# Patient Record
Sex: MALE | Race: WHITE | NOT HISPANIC OR LATINO | Employment: FULL TIME | URBAN - METROPOLITAN AREA
[De-identification: names, ages, dates, MRNs, and addresses within clinical notes are randomized per-mention and may not be internally consistent; named-entity substitution may affect disease eponyms.]

---

## 2020-02-23 DIAGNOSIS — Z71.9 HEALTH EDUCATION/COUNSELING: Primary | ICD-10-CM

## 2020-02-27 ENCOUNTER — NUTRITION (OUTPATIENT)
Dept: NUTRITION | Facility: CLINIC | Age: 65
End: 2020-02-27
Payer: COMMERCIAL

## 2020-02-27 DIAGNOSIS — Z71.9 HEALTH EDUCATION/COUNSELING: ICD-10-CM

## 2020-02-27 LAB
ERYTHROCYTE [DISTWIDTH] IN BLOOD BY AUTOMATED COUNT: 13.1 % (ref 11.5–14.5)
ERYTHROCYTE [SEDIMENTATION RATE] IN BLOOD BY WESTERGREN METHOD: <2 MM/HR (ref 0–23)
HCT VFR BLD AUTO: 50.2 % (ref 40–54)
HGB BLD-MCNC: 16.3 G/DL (ref 14–18)
MCH RBC QN AUTO: 30.8 PG (ref 27–31)
MCHC RBC AUTO-ENTMCNC: 32.5 G/DL (ref 32–36)
MCV RBC AUTO: 95 FL (ref 82–98)
PLATELET # BLD AUTO: 247 K/UL (ref 150–350)
PMV BLD AUTO: 10.9 FL (ref 9.2–12.9)
RBC # BLD AUTO: 5.3 M/UL (ref 4.6–6.2)
WBC # BLD AUTO: 9.4 K/UL (ref 3.9–12.7)

## 2020-02-27 PROCEDURE — 36415 COLL VENOUS BLD VENIPUNCTURE: CPT

## 2020-02-28 LAB
ALBUMIN SERPL BCP-MCNC: 3.7 G/DL (ref 3.5–5.2)
ALP SERPL-CCNC: 64 U/L (ref 55–135)
ALT SERPL W/O P-5'-P-CCNC: 21 U/L (ref 10–44)
ANION GAP SERPL CALC-SCNC: 11 MMOL/L (ref 8–16)
AST SERPL-CCNC: 22 U/L (ref 10–40)
BILIRUB SERPL-MCNC: 0.6 MG/DL (ref 0.1–1)
BUN SERPL-MCNC: 18 MG/DL (ref 8–23)
CALCIUM SERPL-MCNC: 8.9 MG/DL (ref 8.7–10.5)
CHLORIDE SERPL-SCNC: 103 MMOL/L (ref 95–110)
CHOLEST SERPL-MCNC: 210 MG/DL (ref 120–199)
CHOLEST/HDLC SERPL: 2.8 {RATIO} (ref 2–5)
CO2 SERPL-SCNC: 25 MMOL/L (ref 23–29)
CREAT SERPL-MCNC: 1 MG/DL (ref 0.5–1.4)
CRP SERPL-MCNC: 1.93 MG/L (ref 0–3.19)
EST. GFR  (AFRICAN AMERICAN): >60 ML/MIN/1.73 M^2
EST. GFR  (NON AFRICAN AMERICAN): >60 ML/MIN/1.73 M^2
FOLATE SERPL-MCNC: 16 NG/ML (ref 4–24)
GGT SERPL-CCNC: 27 U/L (ref 8–55)
GLUCOSE SERPL-MCNC: 85 MG/DL (ref 70–110)
HDLC SERPL-MCNC: 75 MG/DL (ref 40–75)
HDLC SERPL: 35.7 % (ref 20–50)
LDLC SERPL CALC-MCNC: 111 MG/DL (ref 63–159)
NONHDLC SERPL-MCNC: 135 MG/DL
POTASSIUM SERPL-SCNC: 4.1 MMOL/L (ref 3.5–5.1)
PROT SERPL-MCNC: 7 G/DL (ref 6–8.4)
SODIUM SERPL-SCNC: 139 MMOL/L (ref 136–145)
TRIGL SERPL-MCNC: 120 MG/DL (ref 30–150)
VIT B12 SERPL-MCNC: 714 PG/ML (ref 210–950)

## 2021-04-28 ENCOUNTER — PATIENT MESSAGE (OUTPATIENT)
Dept: RESEARCH | Facility: HOSPITAL | Age: 66
End: 2021-04-28

## 2022-02-22 DIAGNOSIS — Z71.9 HEALTH EDUCATION/COUNSELING: Primary | ICD-10-CM

## 2022-08-26 DIAGNOSIS — M25.571 CHRONIC PAIN OF RIGHT ANKLE: Primary | ICD-10-CM

## 2022-08-26 DIAGNOSIS — G89.29 CHRONIC PAIN OF RIGHT ANKLE: Primary | ICD-10-CM

## 2022-08-29 ENCOUNTER — HOSPITAL ENCOUNTER (OUTPATIENT)
Dept: RADIOLOGY | Facility: HOSPITAL | Age: 67
Discharge: HOME | End: 2022-08-29
Attending: ORTHOPAEDIC SURGERY
Payer: MEDICARE

## 2022-08-29 ENCOUNTER — OFFICE VISIT (OUTPATIENT)
Dept: ORTHOPEDICS | Facility: CLINIC | Age: 67
End: 2022-08-29
Payer: MEDICARE

## 2022-08-29 VITALS — HEIGHT: 71 IN | BODY MASS INDEX: 30.8 KG/M2 | WEIGHT: 220 LBS

## 2022-08-29 DIAGNOSIS — M19.079 ARTHRITIS OF ANKLE: ICD-10-CM

## 2022-08-29 DIAGNOSIS — G89.29 CHRONIC PAIN OF RIGHT ANKLE: Primary | ICD-10-CM

## 2022-08-29 DIAGNOSIS — S93.421A SPRAIN OF DELTOID LIGAMENT OF RIGHT ANKLE, INITIAL ENCOUNTER: ICD-10-CM

## 2022-08-29 DIAGNOSIS — M25.571 CHRONIC PAIN OF RIGHT ANKLE: Primary | ICD-10-CM

## 2022-08-29 DIAGNOSIS — G89.29 CHRONIC PAIN OF RIGHT ANKLE: ICD-10-CM

## 2022-08-29 DIAGNOSIS — M25.571 CHRONIC PAIN OF RIGHT ANKLE: ICD-10-CM

## 2022-08-29 DIAGNOSIS — M25.871 IMPINGEMENT SYNDROME OF RIGHT ANKLE: Primary | ICD-10-CM

## 2022-08-29 PROCEDURE — 99999 PR PBB SHADOW E&M-EST. PATIENT-LVL II: ICD-10-PCS | Mod: PBBFAC,,, | Performed by: ORTHOPAEDIC SURGERY

## 2022-08-29 PROCEDURE — 99999 PR PBB SHADOW E&M-EST. PATIENT-LVL II: CPT | Mod: PBBFAC,,, | Performed by: ORTHOPAEDIC SURGERY

## 2022-08-29 PROCEDURE — 73610 X-RAY EXAM OF ANKLE: CPT | Mod: 26,RT,, | Performed by: STUDENT IN AN ORGANIZED HEALTH CARE EDUCATION/TRAINING PROGRAM

## 2022-08-29 PROCEDURE — 99203 PR OFFICE/OUTPT VISIT, NEW, LEVL III, 30-44 MIN: ICD-10-PCS | Mod: S$GLB,,, | Performed by: ORTHOPAEDIC SURGERY

## 2022-08-29 PROCEDURE — 99203 OFFICE O/P NEW LOW 30 MIN: CPT | Mod: S$GLB,,, | Performed by: ORTHOPAEDIC SURGERY

## 2022-08-29 PROCEDURE — 73610 X-RAY EXAM OF ANKLE: CPT | Mod: TC,RT

## 2022-08-29 PROCEDURE — 73610 XR ANKLE COMPLETE 3 VIEW RIGHT: ICD-10-PCS | Mod: 26,RT,, | Performed by: STUDENT IN AN ORGANIZED HEALTH CARE EDUCATION/TRAINING PROGRAM

## 2022-08-29 PROCEDURE — 99212 OFFICE O/P EST SF 10 MIN: CPT | Mod: PBBFAC | Performed by: ORTHOPAEDIC SURGERY

## 2022-08-29 NOTE — PROGRESS NOTES
Patient ID: Kj Yu  YOB: 1955  MRN: [id number]    Chief Complaint: Pain of the Right Ankle      Referred By: [employer] [facility]     History of Present Illness: Kj Yu is a  66 y.o. male    with a chief complaint of Pain of the Right Ankle    Kj presents today as a new patient with right ankle pain. He reports that he initially injured his ankle in April when tripping down stairs. He rolled it but is unsure which way. He did not feel a pop. He reports managing with rest, icing and an over the counter brace. He reports continued medial ankle pain and instability. He feels better when stabilized.     HPI    Past Medical History:   History reviewed. No pertinent past medical history.  Past Surgical History:   Procedure Laterality Date    HERNIA REPAIR      KNEE SURGERY       Family History   Problem Relation Age of Onset    Heart disease Mother      Social History     Socioeconomic History    Marital status:    Tobacco Use    Smoking status: Never       Review of patient's allergies indicates:   Allergen Reactions    Codeine      ROS    Physical Exam:   Body mass index is 30.68 kg/m².  There were no vitals filed for this visit.   GENERAL: Well appearing, appropriate for stated age, no acute distress.  CARDIOVASCULAR: Pulses regular by peripheral palpation.  PULMONARY: Respirations are even and non-labored.  NEURO: Awake, alert, and oriented x 3.  PSYCH: Mood & affect are appropriate.  HEENT: Head is normocephalic and atraumatic.          Right Ankle/Foot Exam     Inspection   Effusion: Ankle - present     Range of Motion   Ankle Joint   Dorsiflexion:  20   Plantar flexion:  25   Subtalar Joint   Inversion:  15   Eversion:  15     Tests   Anterior drawer: negative  Varus tilt: positive  Squeeze Test: negative    Other   Ankle Crepitus: present  Sensation: normal        Muscle Strength   Right Lower Extremity   Anterior tibial:  5/5   Posterior tibial:   5/5   Gastrocsoleus:  5/5   Peroneal muscle:  5/5     Vascular Exam     Right Pulses  Dorsalis Pedis:      2+  Posterior Tibial:      2+        Imaging:    X-Ray Ankle Complete Right  Narrative: EXAMINATION:  Three radiographic views of the RIGHT ANKLE.    CLINICAL HISTORY:  Pain in right ankle and joints of right foot    TECHNIQUE:  Three radiographic views of the RIGHT ANKLE.    COMPARISON:  None.    FINDINGS:  Three views of the right ankle demonstrate normal alignment.  There is no fracture.  There is no bony mass lesion.  The talar dome is intact.  The mortise joint is symmetric.  There is no soft tissue swelling.  Impression: No acute abnormality of the right ankle.    Electronically signed by: Silvano Gonzalez MD  Date:    08/29/2022  Time:    13:06      Relevant imaging results reviewed and interpreted by me, discussed with the patient and / or family today.     Other Tests:         Patient Instructions   Assessment:  Kj Yu is a  66 y.o. male    with a chief complaint of Pain of the Right Ankle    Medial impingement of right ankle   Ankle sprain  with recurrent instability     Encounter Diagnoses   Name Primary?    Impingement syndrome of right ankle Yes    Sprain of deltoid ligament of right ankle, initial encounter     Arthritis of ankle       Plan:  Brace for stability  Start PT at [facility]   Referral to Dr. Hyde for IA CSI of right ankle if no improvement vs MRI   Discussed referral to foot and ankle to discuss fusion     Follow-up: 4 weeks with gravity stress view or sooner if there are any problems between now and then.    Leave Review:   Google: Leave Google Review  Healthgrades: Leave Healthgrades Review    After Hours Number: [phone]      Provider Note/Medical Decision Making:       I discussed worrisome and red flag signs and symptoms with the patient. The patient expressed understanding and agreed to alert me immediately or to go to the emergency room if  they experience any of these.   Treatment plan was developed with input from the patient/family, and they expressed understanding and agreement with the plan. All questions were answered today.    Disclaimer: This note was prepared using a voice recognition system and is likely to have sound alike errors within the text.     I, Kamilla Roberts, acted as a scribe for Robert Hernández MD for the duration of this office visit.

## 2022-08-29 NOTE — PATIENT INSTRUCTIONS
Assessment:  Kj Yu is a  66 y.o. male    with a chief complaint of Pain of the Right Ankle    Medial impingement of right ankle   Ankle sprain  with recurrent instability     Encounter Diagnoses   Name Primary?    Impingement syndrome of right ankle Yes    Sprain of deltoid ligament of right ankle, initial encounter     Arthritis of ankle       Plan:  Brace for stability  Start PT at [facility]   Referral to Dr. Hyde for IA CSI of right ankle if no improvement vs MRI   Discussed referral to foot and ankle to discuss fusion     Follow-up: 4 weeks with gravity stress view or sooner if there are any problems between now and then.    Leave Review:   Google: Leave Google Review  Healthgrades: Leave Healthgrades Review    After Hours Number: [phone]

## 2022-09-13 ENCOUNTER — CLINICAL SUPPORT (OUTPATIENT)
Dept: REHABILITATION | Facility: HOSPITAL | Age: 67
End: 2022-09-13
Attending: ORTHOPAEDIC SURGERY
Payer: MEDICARE

## 2022-09-13 DIAGNOSIS — M25.871 IMPINGEMENT SYNDROME OF RIGHT ANKLE: ICD-10-CM

## 2022-09-13 DIAGNOSIS — M25.571 CHRONIC PAIN OF RIGHT ANKLE: ICD-10-CM

## 2022-09-13 DIAGNOSIS — M25.561 ACUTE PAIN OF BOTH KNEES: ICD-10-CM

## 2022-09-13 DIAGNOSIS — M25.562 ACUTE PAIN OF BOTH KNEES: ICD-10-CM

## 2022-09-13 DIAGNOSIS — S93.421A SPRAIN OF DELTOID LIGAMENT OF RIGHT ANKLE, INITIAL ENCOUNTER: ICD-10-CM

## 2022-09-13 DIAGNOSIS — G89.29 CHRONIC PAIN OF RIGHT ANKLE: ICD-10-CM

## 2022-09-13 DIAGNOSIS — R29.898 DECREASED STRENGTH OF LOWER EXTREMITY: ICD-10-CM

## 2022-09-13 DIAGNOSIS — M19.079 ARTHRITIS OF ANKLE: ICD-10-CM

## 2022-09-13 PROCEDURE — 97162 PT EVAL MOD COMPLEX 30 MIN: CPT | Performed by: PHYSICAL THERAPIST

## 2022-09-13 PROCEDURE — 97110 THERAPEUTIC EXERCISES: CPT | Performed by: PHYSICAL THERAPIST

## 2022-09-13 NOTE — PLAN OF CARE
[facility]   Physical Therapy Initial Evaluation   Date: 9/13/2022   Name: Kj Yu  Clinic Number: [id number]    Therapy Diagnosis:    Encounter Diagnoses   Name Primary?    Acute pain of both knees     Decreased strength of lower extremity     Chronic pain of right ankle     Impingement syndrome of right ankle     Sprain of deltoid ligament of right ankle, initial encounter     Arthritis of ankle       Physician: Robert Hernández MD     Physician Orders: PT Eval and Treat  Medical Diagnosis from Referral: impingement syndrome of right ankle, sprain of deltoid ligament of right ankle, arthritis of ankle  Evaluation Date: 9/13/2022  Authorization Period Expiration: 8/29/2022  Plan of Care Expiration: 12/12/2022  Progress Note Due: 10/13/2022  Visit # / Visits authorized: 1/1   FOTO: 1/3     Precautions: Standard and inguinal hernia (right)    Time In: 1220  Time Out: 1300  Total Billable Time (timed & untimed codes): 40 minutes    SUBJECTIVE   Date of onset: April of 2022    History of current condition - Kj reports: that he twisted his right ankle in April coming down stairs. The ankle seemed to get better on its own, but then he went hiking at the end of July. Not long after that, in early August, his ankle started bothering him again. Over past week to week and a half, the ankle has been feeling better again. However, both of his knees start bothering him about a week ago, with the left being worse than the right. He is unsure what is causing the knee pain, but that is his bigger issue at this time. Patient reports that he had a right inguinal hernia repair in June of 2022, and he just found out he has another on the same leg. This will be his third inguinal hernia on this right side. Patient reports that his doctor is trying to schedule another surgery. His knee pain could be coming from compensating due to hip pain as well.   PMH: Previous meniscectomy approximately 15  years ago - unsure of which knee    Falls: none    Exercise Regimen: prior to hernia surgery 6/2022 was doing body pump and elliptical; however, he has not been able to do much since his surgery     Imaging: x-rays performed on 8/29/2022    Pain:  Current 4/10, worst 6/10, best 2/10   Location: bilateral knee pain, L>R  Description: Aching, Burning, Throbbing, and Sharp  Aggravating Factors: standing after prolonged sitting, laying down at night  Easing Factors: ice and elevation, stretching, walking    Prior Therapy: Yes - knee and gege  Social History: Pt lives with their spouse  Occupation: Pt is a commercial/  Prior Level of Function: Independent and pain free with all ADL, IADL, community mobility and functional activities.   Current Level of Function: Independent with all ADL, IADL, community mobility and functional activities with reports of increased pain and need for increased time and frequent breaks.      Dominant Extremity: right    Pts goals: Pt reported goals are to decrease overall pain levels in order to return to prior functional level.       Medical History:   No past medical history on file.    Surgical History:   Kj Yu  has a past surgical history that includes Knee surgery and Hernia repair.    Medications:   Kj currently has no medications in their medication list.    Allergies:   Review of patient's allergies indicates:   Allergen Reactions    Codeine         OBJECTIVE     RANGE OF MOTION:    Knee AROM/PROM Right Left Pain/Dysfunction with Movement Goal   Knee Flexion (135) 130 130 No pain with ROM testing     Knee Extension (0) 0 0       Ankle/Foot AROM/PROM Right Left Pain/Dysfunction with Movement Goal   Dorsiflexion (20) 10 10 No pain with ROM testing 15   Plantarflexion (50) 50 50     Inversion (35) 30 30  35   Eversion (15) 8 10  15       STRENGTH:    L/E MMT Right Left Pain/Dysfunction with Movement Goal   Hip Flexion  3+/5 4+/5 Pain in right hip due to  hernia 4+/5 B   Hip Extension  NT NT NT due to right hip pain from hernia 4+/5 B   Hip Abduction  4-/5 4/5  4+/5 B   Knee Extension 4/5 4/5  5/5 B   Knee Flexion 4+/5 4+/5  5/5 B   Ankle DF 5/5 5/5  5/5 B   Ankle PF 4+/5 4+/5  5/5 B   Ankle Inversion 5/5 5/5  5/5 B   Ankle Eversion 4+/5 4+/5  5/5 B       MUSCLE LENGTH:     Muscle Tested  Right Left  Goal   Hip Flexors decreased decreased Normal B   Quadriceps decreased decreased Normal B   Hamstrings  decreased decreased Normal B   Piriformis  decreased decreased Normal B   Gastrocnemius  decreased decreased Normal B   Soleus  decreased decreased Normal B       JOINT MOBILITY:     Joint Motion Tested Right Left  Goal   Patellar mobility Normal Normal Normal B   Tibiofemoral Jt Hypomobile Hypomobile Normal B   Talocrural Jt Normal Normal Normal B       SPECIAL TESTS:     Right Left  Goal   Julio C Test Negative Negative Negative B    Varus Stress Test Negative Negative Negative B    Valgus Stress Test Negative Negative Negative B    Anterior Drawer (ankle) Negative Negative Negative B        Sensation:  Sensation is intact to light touch     Palpation: Increased tone and tenderness noted with palpation of bilateral quadriceps and hamstrings . Increased tenderness noted with palpation of right ITB . Girth Measurements: Ankle malleoli:  27cm R vs 26cm L    Posture:  Pt presents with postural abnormalities which include: forward head, rounded shoulders , and genu valgum     Gait Analysis: The patient ambulated with the following assistive device: none; the pt presents with the following gait abnormalities: bradykinetic, decreased step length bilateral, decreased stance time on right, decreased hip extension on right, and decreased right hip flexion.      Movement Analysis Observations noted   Unable to squat due to right inguinal hernia and associated pain    Sit to stands Guarded and slow due to right inguinal hernia and associated pain               FUNCTION:     CMS  Impairment/Limitation/Restriction for FOTO Ankle Survey    Therapist reviewed FOTO scores for Kj on 9/13/2022.   FOTO documents entered into [employer] - see Media section.    Limitation Score: 24%         TREATMENT     Total Treatment time (time-based codes) separate from Evaluation: 8 minutes       Kj received the treatments listed below:      THERAPEUTIC EXERCISES to develop strength, endurance, ROM, flexibility, posture, and core stabilization for (8) minutes including:    Intervention Performed Today    HEP established x See Patient Instructions for details - discussed importance of foot intrinsic strengthening, heel raises (regular and eccentric), ankle 4-way, and LAQ's                                        Plan for Next Visit:        PATIENT EDUCATION AND HOME EXERCISES     Education provided:   PURPOSE: Patient educated on the impairments noted above and the effects of physical therapy intervention to improve overall condition and QOL.   EXERCISE: Patient was educated on all the above exercise prior/during/after for proper posture, positioning, and execution for safe performance with home exercise program.   STRENGTH: Patient educated on the importance of improved core and extremity strength in order to improve alignment of the spine and extremities with static positions and dynamic movement.   GAIT & BALANCE: Patient educated on the importance of strong core and lower extremity musculature in order to improve both static and dynamic balance, improve gait mechanics, reduce fall risk and improve household and community mobility.   POSTURE: Patient educated on postural awareness to reduce stress and maintain optimal alignment of the spine with static positions and dynamic movement     Written Home Exercises Provided: yes.  Exercises were reviewed and Kj was able to demonstrate them prior to the end of the session.  Kj demonstrated good  understanding of the education provided. See EMR under  "Patient Instructions for exercises provided during therapy sessions.    ASSESSMENT     Kj is a 66 y.o. male referred to outpatient Physical Therapy with a medical diagnosis of impingement syndrome of right ankle, sprain of deltoid ligament of right ankle, arthritis of ankle. Pt presents with impairments including: decreased ROM, decreased strength, decreased joint mobility, decreased muscle length, postural abnormalities, gait abnormalities, and decreased overall function.    Pt prognosis is Good.   Pt will benefit from skilled outpatient Physical Therapy to address the deficits stated above and in the chart below, provide pt/family education, and to maximize pt's level of independence.     Plan of care discussed with patient: Yes  Pt's spiritual, cultural and educational needs considered and patient is agreeable to the plan of care and goals as stated below:     Anticipated Barriers for therapy: co-morbidities    Medical Necessity is demonstrated by the following  History  Co-morbidities and personal factors that may impact the plan of care Co-morbidities:   3rd right inguinal hernia and previous knee surgery    Personal Factors:   no deficits     moderate   Examination  Body Structures and Functions, activity limitations and participation restrictions that may impact the plan of care Body Regions:   lower extremities    Body Systems:    ROM  strength  balance  gait  transfers  transitions    Participation Restrictions:   See above in "Current Level of Function"     Activity limitations:   Learning and applying knowledge  no deficits    General Tasks and Commands  no deficits    Communication  no deficits    Mobility  lifting and carrying objects  walking  driving (bike, car, motorcycle)    Self care  no deficits    Domestic Life  shopping  cooking  doing house work (cleaning house, washing dishes, laundry)    Interactions/Relationships  no deficits    Life Areas  no deficits    Community and Social " Life  community life  recreation and leisure         moderate   Clinical Presentation evolving clinical presentation with changing clinical characteristics moderate   Decision Making/ Complexity Score: moderate       GOALS:    Short Term Goals:  12 weeks Progress    Pain: Pt will demonstrate improved pain by reports of less than or equal to 4/10 worst pain on the verbal rating scale in order to progress toward maximal functional ability and improve QOL. PC   Function: Patient will demonstrate improved function as indicated by a functional limitation score of less than or equal to 29 out of 100 on FOTO. PC   Strength: Patient will improve strength to 50% of stated goals, listed in objective measures above, in order to progress towards independence with functional activities.  PC   Gait: Patient will demonstrate improved gait mechanics in order to improve functional mobility, improve quality of life, and decrease risk of further injury or fall.  PC   HEP: Patient will demonstrate independence with HEP in order to progress toward functional independence. PC     Long Term Goals:  12 weeks Progress   Pain: Pt will demonstrate improved pain by reports of less than or equal to 2/10 worst pain on the verbal rating scale in order to progress toward maximal functional ability and improve QOL.   PC   Function: Patient will demonstrate improved function as indicated by a functional limitation score of less than or equal to 24 out of 100 on FOTO. PC   Mobility: Patient will improve AROM to stated goals, listed in objective measures above, in order to return to maximal functional potential and improve quality of life. PC   Strength: Patient will improve strength to stated goals, listed in objective measures above, in order to improve functional independence and quality of life. PC   Gait: Patient will demonstrate normalized gait mechanics with minimal compensation in order to return to PLOF. PC   Patient will return to normal  ADL's, IADL's, community involvement, recreational activities, and work-related activities with less than or equal to 0/10 pain and maximal function.  PC     Goals Key:  PC= progressing/continue; PM= partially met;        DC= discontinue    PLAN   Plan of care Certification: 9/13/2022 to 12/12/2022.    Outpatient Physical Therapy 2 times weekly for 12 weeks to include any combination of the following interventions: virtual visits, dry needling, modalities, electrical stimulation (IFC, Pre-Mod, Attended with Functional Dry Needling), Aquatic Therapy, Cervical/Lumbar Traction, Gait Training, Manual Therapy, Neuromuscular Re-ed, Patient Education, Self Care, Therapeutic Exercise, and Therapeutic Activites.   **Patient's POC will be put on hold at this time until after he received hernial surgery. He will work on HEP for now until surgery, and then he will pause until cleared by MD. Once cleared by MD, he will notify us and return for PT treatment if necessary.      Nguyen Bajwa, PT, DPT      I CERTIFY THE NEED FOR THESE SERVICES FURNISHED UNDER THIS PLAN OF TREATMENT AND WHILE UNDER MY CARE   Physician's comments:     Physician's Signature: ___________________________________________________

## 2022-09-18 PROBLEM — M25.571 CHRONIC PAIN OF RIGHT ANKLE: Status: ACTIVE | Noted: 2022-09-18

## 2022-09-18 PROBLEM — M25.562 ACUTE PAIN OF BOTH KNEES: Status: ACTIVE | Noted: 2022-09-18

## 2022-09-18 PROBLEM — R29.898 DECREASED STRENGTH OF LOWER EXTREMITY: Status: ACTIVE | Noted: 2022-09-18

## 2022-09-18 PROBLEM — G89.29 CHRONIC PAIN OF RIGHT ANKLE: Status: ACTIVE | Noted: 2022-09-18

## 2022-09-18 PROBLEM — M25.561 ACUTE PAIN OF BOTH KNEES: Status: ACTIVE | Noted: 2022-09-18

## 2022-09-18 PROBLEM — S93.421A SPRAIN OF DELTOID LIGAMENT OF RIGHT ANKLE: Status: ACTIVE | Noted: 2022-09-18

## 2022-09-18 PROBLEM — M25.871 IMPINGEMENT SYNDROME OF RIGHT ANKLE: Status: ACTIVE | Noted: 2022-09-18

## 2022-09-18 NOTE — PATIENT INSTRUCTIONS
Toe Series - Toe Yoga        Sit with knee stacked above ankle. Maintain the ball of the foot and heel on the floor the entire exercise.    1) Lift the big toe, keeping the little toes planted on the floor. Hold for 5 seconds  2) Lift the little toes, keeping the big toe planted on the floor. Hold for 5 seconds  3) Time yourself for 3 minutes, alternating positions 1 & 2 back and forth throughout that time.      Toe Series - Abduction/Adduction          Sit with knee stacked above ankle    1) Abduction - Spread toes as far apart from each other as possible. The toes may lift off the floor. Hold for seconds  2) Adduction - Push toes together. Hold for 5 seconds  3) Time yourself for 3 minutes, alternating positions 1 & 2 back and forth throughout that time.      Toe Series - Flexion/Extension        Sit with knee stacked above ankle    1) Flexion - Curl toes down toward floor. Hold for 5 seconds  2) Extension - Lift toes up toward ceiling. Hold for 5 seconds  3) Time yourself for 3 minutes, alternating positions 1 & 2 back and forth throughout that time.  4) Place towel roll under ball of foot, but keep heel on the ground.      Arch Raise        1) Sit in a chair with both feet placed flat on the floor   2) Raise the arch of your foot by sliding your big toe toward your heel without curling your toes or lifting your heel S  3) Hold the position for 3 seconds then relax and repeat  4) Time yourself for 3 minutes, relaxing and repeating throughout that time.  5) Variations can be performed by moving the feet farther away from you or turning the foot inward or outward to challenge the muscles from different positions.  6) Once you feel comfortable performing the short foot movement you can gradually progress to performing the exercise while standing and then eventually from a single-leg standing position.      CALF STRETCH WITH TOWEL - GASTROCNEMIUS        While in a seated position, place a towel around the ball of  your foot and pull your ankle back until a stretch is felt on your calf area.    Keep your knee in a straightened position during the stretch.    Hold stretch for 30 seconds and repeat 3 times on each side, 1 times per day       CALF STRETCH WITH TOWEL - SOLEUS        While in a seated position, place a towel around the ball of your foot and pull your ankle back until a stretch is felt on your calf area.    Keep your knee in a bent position during the stretch.    Hold stretch for 30 seconds and repeat 3 times on each side, 1 times per day       *All exercises listed above obtained from [email or web address]

## 2022-09-23 ENCOUNTER — PATIENT MESSAGE (OUTPATIENT)
Dept: ORTHOPEDICS | Facility: CLINIC | Age: 67
End: 2022-09-23
Payer: MEDICARE

## 2022-10-10 ENCOUNTER — PATIENT MESSAGE (OUTPATIENT)
Dept: RESEARCH | Facility: HOSPITAL | Age: 67
End: 2022-10-10
Payer: MEDICARE

## 2022-12-23 ENCOUNTER — DOCUMENTATION ONLY (OUTPATIENT)
Dept: REHABILITATION | Facility: HOSPITAL | Age: 67
End: 2022-12-23
Payer: MEDICARE

## 2022-12-23 NOTE — PROGRESS NOTES
[facility]  Physical Therapy Discharge Note    Name: Kj Yu  [facility] Number: [id number]    Therapy Diagnosis: No diagnosis found.  Physician: No ref. provider found    Physician Orders: PT Eval and Treat  Medical Diagnosis from Referral: impingement syndrome of right ankle, sprain of deltoid ligament of right ankle, arthritis of ankle  Evaluation Date: 9/13/2022      Date of Last visit: 09/13/2022  Total Visits Received: 1    ASSESSMENT      Unable to assess as patient has not returned since initial evaluation.     Discharge reason: Patient has not attended therapy since 09/13/2022    Discharge FOTO Score: Not applicable.    Goals: See initial evaluation.    PLAN   This patient is discharged from PT.    More Dumont, PT, DPT

## 2023-02-15 DIAGNOSIS — Z71.9 HEALTH EDUCATION/COUNSELING: Primary | ICD-10-CM

## 2023-04-12 ENCOUNTER — TELEPHONE (OUTPATIENT)
Dept: PRIMARY CARE CLINIC | Facility: CLINIC | Age: 68
End: 2023-04-12
Payer: MEDICARE

## 2023-04-12 NOTE — TELEPHONE ENCOUNTER
Pt called regarding labs. Pt didn't answer, lvm to call office back.  ----- Message from Edwina Dodge MD sent at 4/11/2023 10:56 PM CDT -----  POST-CHALLENGE:  Normal Labs:  Thank you for participating in Alcohol [facility] For 40! Your labs are within normal range. However, if you have any questions that you feel need to be addressed and would like to review your labs with a physician, please make an appointment with your primary care provider. You can make this appointment using your [facility] portal.     
Statement Selected

## 2023-04-13 ENCOUNTER — TELEPHONE (OUTPATIENT)
Dept: PRIMARY CARE CLINIC | Facility: CLINIC | Age: 68
End: 2023-04-13
Payer: MEDICARE

## 2023-04-13 NOTE — TELEPHONE ENCOUNTER
Pt called regarding labs. Pt didn't answer, lvm to call office back.  ----- Message from Edwina Dodge MD sent at 4/11/2023 10:56 PM CDT -----  POST-CHALLENGE:  Normal Labs:  Thank you for participating in Alcohol Free For 40! Your labs are within normal range. However, if you have any questions that you feel need to be addressed and would like to review your labs with a physician, please make an appointment with your primary care provider. You can make this appointment using your [facility] portal.

## 2023-04-14 ENCOUNTER — TELEPHONE (OUTPATIENT)
Dept: PRIMARY CARE CLINIC | Facility: CLINIC | Age: 68
End: 2023-04-14
Payer: MEDICARE

## 2023-04-14 NOTE — TELEPHONE ENCOUNTER
----- Message from Edwina Dogde MD sent at 4/11/2023 10:56 PM CDT -----  POST-CHALLENGE:  Normal Labs:  Thank you for participating in Alcohol Free For 40! Your labs are within normal range. However, if you have any questions that you feel need to be addressed and would like to review your labs with a physician, please make an appointment with your primary care provider. You can make this appointment using your [facility] portal.

## 2024-01-04 DIAGNOSIS — Z71.9 HEALTH EDUCATION/COUNSELING: Primary | ICD-10-CM

## 2024-03-28 ENCOUNTER — OFFICE VISIT (OUTPATIENT)
Dept: ALLERGY | Facility: CLINIC | Age: 69
End: 2024-03-28
Payer: MEDICARE

## 2024-03-28 VITALS — BODY MASS INDEX: 31.79 KG/M2 | HEIGHT: 71 IN | WEIGHT: 227.06 LBS

## 2024-03-28 DIAGNOSIS — L50.8 CHRONIC URTICARIA: Primary | ICD-10-CM

## 2024-03-28 PROCEDURE — 99205 OFFICE O/P NEW HI 60 MIN: CPT | Mod: S$GLB,,, | Performed by: ALLERGY & IMMUNOLOGY

## 2024-03-28 PROCEDURE — 1159F MED LIST DOCD IN RCRD: CPT | Mod: CPTII,S$GLB,, | Performed by: ALLERGY & IMMUNOLOGY

## 2024-03-28 PROCEDURE — 99999 PR PBB SHADOW E&M-EST. PATIENT-LVL III: CPT | Mod: PBBFAC,,, | Performed by: ALLERGY & IMMUNOLOGY

## 2024-03-28 PROCEDURE — 1160F RVW MEDS BY RX/DR IN RCRD: CPT | Mod: CPTII,S$GLB,, | Performed by: ALLERGY & IMMUNOLOGY

## 2024-03-28 PROCEDURE — 1126F AMNT PAIN NOTED NONE PRSNT: CPT | Mod: CPTII,S$GLB,, | Performed by: ALLERGY & IMMUNOLOGY

## 2024-03-28 PROCEDURE — 3008F BODY MASS INDEX DOCD: CPT | Mod: CPTII,S$GLB,, | Performed by: ALLERGY & IMMUNOLOGY

## 2024-03-28 RX ORDER — CYCLOSPORINE 100 MG/1
100 CAPSULE, LIQUID FILLED ORAL 2 TIMES DAILY
COMMUNITY

## 2024-03-28 RX ORDER — CYCLOSPORINE 100 MG/1
100 CAPSULE, GELATIN COATED ORAL
COMMUNITY

## 2024-03-28 RX ORDER — FAMOTIDINE 20 MG/1
20 TABLET, FILM COATED ORAL 2 TIMES DAILY
COMMUNITY

## 2024-03-28 RX ORDER — MULTIVITAMIN
1 TABLET ORAL EVERY MORNING
COMMUNITY

## 2024-03-28 RX ORDER — TADALAFIL 10 MG/1
10 TABLET ORAL DAILY PRN
COMMUNITY
Start: 2023-10-04 | End: 2024-10-03

## 2024-03-28 RX ORDER — CHOLECALCIFEROL (VITAMIN D3) 25 MCG
1 TABLET ORAL EVERY MORNING
COMMUNITY

## 2024-03-28 RX ORDER — LEVOCETIRIZINE DIHYDROCHLORIDE 5 MG/1
5 TABLET, FILM COATED ORAL NIGHTLY
COMMUNITY

## 2024-03-28 NOTE — PROGRESS NOTES
Subjective:       Patient ID: Kj Yu is a 68 y.o. male.    Chief Complaint:  Urticaria (Over 3 mo)      69 yo man presents for new patient evaluation of hives. He states started after Nancy. He wok ne day with hives all over. He did have Covid earlier in December. He saw PCP when this happened and given steroid. He had side effects with heart racing so stopped and then referred to allergist, Dr Garza. Hives are red raised and itchy. Mostly come on at night and fade through day then come back at night. Itch is severe and keeps him from sleeping. No shane once go away. Dr Garza started xyzal and increased up to 2 pills BID, also started Pepcid and increased up to BID. This has helped some but still wakes with hives and not sleeping well. Last night he took benadryl and that helped more. He is noticing very dry skin and mouth form the med's. He last saw Dr Garza 3/20 and told can consider Xolair vs cyclosporin. He opted to try cyclosporin so was prescribed 100 mg BID. He took first dose last night and had swelling and elevated BP so did not take again today. He wanted second opinion. He has not had any facial swelling. No SOB or cough. He has no known allergies, no food, insect or latex allergy. Has runny nose in AM but no bad rhinitis. No asthma. No eczema. No other medical issues.        Environmental History: see history section for home environment  Review of Systems   HENT:  Positive for postnasal drip and rhinorrhea. Negative for congestion, facial swelling, nosebleeds and sinus pressure.    Eyes:  Negative for discharge, redness and itching.   Respiratory:  Negative for cough, chest tightness, shortness of breath and wheezing.    Skin:  Positive for color change and rash.        Objective:      Physical Exam  Vitals and nursing note reviewed.   Constitutional:       General: He is not in acute distress.     Appearance: Normal appearance. He is not ill-appearing.   HENT:      Nose: No rhinorrhea.    Eyes:      General:         Right eye: No discharge.         Left eye: No discharge.      Conjunctiva/sclera: Conjunctivae normal.   Pulmonary:      Effort: Pulmonary effort is normal. No respiratory distress.   Abdominal:      General: There is no distension.   Skin:     General: Skin is warm and dry.      Findings: No erythema or rash.   Neurological:      Mental Status: He is alert and oriented to person, place, and time.   Psychiatric:         Mood and Affect: Mood normal.         Behavior: Behavior normal.         Laboratory:   none performed   Assessment:       1. Chronic urticaria         Plan:       Chronic urticaria- advised can be allergic vs systemic vs immune mediated/autoimmune. Advised no trigger and having every day so unlikely allergic. Hash ad labs with normal CBC, CMP and TSH so no systemic cause so is C/w autoimmune,. Explained in detail. As not having hives in day and dry from antihistamines will change to xyzal 10 mg and Pepcid 20 mg at dinner time and benadryl 25-50 mg at bedtime. If still with hives then needs to start omalizumab 300 mg every 28 days as he has failed high dose antihistamines    I spent a total of 60 minutes on the day of the visit.  This includes face to face time and non-face to face time preparing to see the patient (eg, review of tests), obtaining and/or reviewing separately obtained history, documenting clinical information in the electronic or other health record, independently interpreting results and communicating results to the patient/family/caregiver, or care coordinator.